# Patient Record
Sex: MALE | Race: WHITE | NOT HISPANIC OR LATINO | Employment: UNEMPLOYED | URBAN - METROPOLITAN AREA
[De-identification: names, ages, dates, MRNs, and addresses within clinical notes are randomized per-mention and may not be internally consistent; named-entity substitution may affect disease eponyms.]

---

## 2023-09-06 ENCOUNTER — HOSPITAL ENCOUNTER (OUTPATIENT)
Facility: CLINIC | Age: 43
Setting detail: OBSERVATION
Discharge: NURSING, REHAB OR LONG-TERM CARE | End: 2023-09-07
Attending: FAMILY MEDICINE | Admitting: FAMILY MEDICINE
Payer: MEDICARE

## 2023-09-06 DIAGNOSIS — Z87.820 HISTORY OF TRAUMATIC BRAIN INJURY: Primary | ICD-10-CM

## 2023-09-06 DIAGNOSIS — F32.A DEPRESSION, UNSPECIFIED DEPRESSION TYPE: ICD-10-CM

## 2023-09-06 DIAGNOSIS — R45.851 SUICIDAL IDEATION: ICD-10-CM

## 2023-09-06 DIAGNOSIS — F15.10 METHAMPHETAMINE ABUSE (H): ICD-10-CM

## 2023-09-06 DIAGNOSIS — S06.9X9S TRAUMATIC BRAIN INJURY WITH LOSS OF CONSCIOUSNESS, SEQUELA (H): ICD-10-CM

## 2023-09-06 PROBLEM — F15.90 STIMULANT USE DISORDER: Status: ACTIVE | Noted: 2023-09-06

## 2023-09-06 PROCEDURE — 99285 EMERGENCY DEPT VISIT HI MDM: CPT | Mod: 25 | Performed by: FAMILY MEDICINE

## 2023-09-06 PROCEDURE — 90791 PSYCH DIAGNOSTIC EVALUATION: CPT

## 2023-09-06 PROCEDURE — G0378 HOSPITAL OBSERVATION PER HR: HCPCS

## 2023-09-06 PROCEDURE — 99223 1ST HOSP IP/OBS HIGH 75: CPT | Performed by: FAMILY MEDICINE

## 2023-09-06 RX ORDER — DIVALPROEX SODIUM 250 MG/1
250 TABLET, DELAYED RELEASE ORAL 2 TIMES DAILY WITH MEALS
Qty: 30 TABLET | Refills: 0 | Status: SHIPPED | OUTPATIENT
Start: 2023-09-06

## 2023-09-06 RX ORDER — QUETIAPINE FUMARATE 50 MG/1
50 TABLET, FILM COATED ORAL AT BEDTIME
Qty: 30 TABLET | Refills: 0 | Status: SHIPPED | OUTPATIENT
Start: 2023-09-06

## 2023-09-06 RX ORDER — GABAPENTIN 300 MG/1
300 CAPSULE ORAL 3 TIMES DAILY
Qty: 30 CAPSULE | Refills: 0 | Status: SHIPPED | OUTPATIENT
Start: 2023-09-06

## 2023-09-06 RX ORDER — QUETIAPINE FUMARATE 25 MG/1
50 TABLET, FILM COATED ORAL AT BEDTIME
Status: DISCONTINUED | OUTPATIENT
Start: 2023-09-06 | End: 2023-09-07 | Stop reason: HOSPADM

## 2023-09-06 RX ORDER — GABAPENTIN 300 MG/1
300 CAPSULE ORAL 3 TIMES DAILY
Status: DISCONTINUED | OUTPATIENT
Start: 2023-09-07 | End: 2023-09-07 | Stop reason: HOSPADM

## 2023-09-06 RX ORDER — DIVALPROEX SODIUM 250 MG/1
250 TABLET, DELAYED RELEASE ORAL 2 TIMES DAILY WITH MEALS
Status: DISCONTINUED | OUTPATIENT
Start: 2023-09-07 | End: 2023-09-07 | Stop reason: HOSPADM

## 2023-09-06 RX ORDER — VENLAFAXINE 50 MG/1
50 TABLET ORAL DAILY
Status: DISCONTINUED | OUTPATIENT
Start: 2023-09-07 | End: 2023-09-07 | Stop reason: HOSPADM

## 2023-09-06 RX ORDER — DIVALPROEX SODIUM 250 MG/1
750 TABLET, DELAYED RELEASE ORAL AT BEDTIME
Qty: 90 TABLET | Refills: 0 | Status: SHIPPED | OUTPATIENT
Start: 2023-09-06

## 2023-09-06 RX ORDER — VENLAFAXINE 50 MG/1
50 TABLET ORAL DAILY
Qty: 30 TABLET | Refills: 0 | Status: SHIPPED | OUTPATIENT
Start: 2023-09-06

## 2023-09-06 ASSESSMENT — ACTIVITIES OF DAILY LIVING (ADL)
ADLS_ACUITY_SCORE: 35

## 2023-09-06 NOTE — ED TRIAGE NOTES
Triage Assessment       Row Name 09/06/23 1611       Triage Assessment (Adult)    Airway WDL WDL       Respiratory WDL    Respiratory WDL WDL       Skin Circulation/Temperature WDL    Skin Circulation/Temperature WDL WDL       Cardiac WDL    Cardiac WDL WDL       Peripheral/Neurovascular WDL    Peripheral Neurovascular WDL WDL       Cognitive/Neuro/Behavioral WDL    Cognitive/Neuro/Behavioral WDL WDL                     no
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
## 2023-09-06 NOTE — PLAN OF CARE
Sacha Fraser  September 6, 2023  Plan of Care Hand-off Note     Patient Care Path: discharge    Plan for Care:   Patient presented in the ED for depression and suicidal ideation. It was unclear is he had a plan. His suicidal intent was motivated by housing and needing a place to stay, however, pt has not been taking his medication to help stabilize his sympmtoms. Patient doesn't appear in acute risk for suicide at this time and the conversation about suicide was conencted to not wanting to be discharged to the streets. He does have peer recovery spoecialist who have the capability to check in with him. Assessors recommend pt to crisis residence, medication management, and HEAVENLY assessment for his meth use.    Patient was referred to [facility]. Patient has been accepted to [facility] but they are wanting him to stabilize overnight in the ED prior to presenting there. Patient can discharge to [facility] in the morning.     Identified Goals and Safety Issues:  Passive suicidal ideation      Legal Status: Legal Status at Admission: Voluntary/Patient has signed consent for treatment    Psychiatry Consult: No psychiatry consult needed at this time.        Updated MD and RN regarding plan of care.      Enrico Vieira

## 2023-09-06 NOTE — DISCHARGE INSTRUCTIONS
Patient to discharge to [facility]- [address] [place] [address] Crisis Residence on 9/7/2023.    Medications to be discharged with patient. (30 day supply sent to [facility])      Aftercare Plan    Follow up with established providers and supports as scheduled. Continue taking medications as prescribed. Abstain from drugs and alcohol. Utilize your [facility] mental health crisis team as needed. They are available 24/7. Contact information is listed below.     You may self refer for most Crisis Residence services. This is a short-term service, typically 3-10 days, for stabilization when experiencing a mental health crisis. They provide housing and treatment services that integrate mental health, medical, and substance use care.    [employer] [facility] [employer]  Main phone: [phone]   Direct: [phone]   [address] [place] [address]     [facility] Crisis Stabilization Program   [phone]   [address] [facility] [address]     Apolonia Singh Located within [facility].  Main phone: [phone]   Direct: [phone]   [address] [place] [address]     [facility]  [phone]   [address] [place] [address]     [facility] Crisis **requires referral from a medical facility  [phone]   [address] [place] [address]     Wherever you attend for a crisis residence, if possible bring any medications you may have in their prescribed bottles.      Aftercare Plan     [facility] Front Door: [phone]  For help getting access to [facility] resources and supports     [facility]: [phone]  [address] [place] [address]  Will help get connected with mental health and physical health services, set up and access insurance, access safe and stable housing, and offers case management services to help you stay on track to reach personal goals.     Yenni  [facility] ([facility]) [address] [address] Phone: [phone]    Hours: Monday and Thursday 10-11:30 am. Drop in center that is staffed with nursing students, nursing staff, student PA s and faculty.  Not a clinic, but focuses on basic healthcare and personal hygiene needs.  Closed when schools are close for the weather or on school breaks, so call ahead      [facility] [address] [address] Phone: [phone]    Hours: Monday through Friday 8 am-5 pm. Community clinic that offers free or low-cost (and sliding fee) health care.  Can help with getting set up with insurance. Offers a wide range of services including family planning (testing, morning after, birth control), pediatrics, counseling, dental for minors, etc.  Can call or walk in.      [facility] [address] [facility], 5th Floor, [place] [address] Phone: [phone]    Hours: Monday-Friday 8 am-5 pm. Call for appointment. [facility] for all ages       [facility] [address], [place] [address] Phone: [phone] Website: [email or web address]    Hours: By appointment only. (Some same-day appointments available.) Monday-Thursday 9 am-7 pm; Friday 9 am-4pm; Saturday 10 am-2 pm. Sliding fee scale.  Sexual health, women's health, family planning.        [facility] [address] [address] Phone: [phone]    Hours: Monday, Wednesday, Thursday and Friday 8 am-5 pm; Tuesday 8 am-8 pm. Sliding fee scale.  medical, dental, disease prevention, diabetic support and counseling      [facility] [address] [address] Phone: [phone], option 1    Hours: Walk-ins Monday, Wednesday, Thursday and Friday 8:30- 11:30 am and 1-3 pm; Tuesday 9:30-11:30 am and 1:45-3 pm. Sliding fee scale. medical, dental, pediatrics, diabetic support and  counseling      [facility] [address] [facility] [address] [facility] [address] [facility] [address] [facility] [address] [place] [address] Main Phone: [phone]    Hours: Monday-Friday 8 am-5 pm. Sliding fee scale.  Cancer screens, CD and mental health services.  Basic medical for adults and peds.  Family planning, testing hypertension screenings.      [facility] [address] [facility] [address] Phone: For Appointments call [phone]    Hours: Monday-Friday 8 am-5 pm. Provides: provides a large range of medical services      [facility] Phone: [phone]   [facility] [address] [place] [address]    Hours: Monday-Friday 8:30 am-5:30 pm.*       [facility] [address] [place] [address]    Hours: Monday-Friday 8:30 am-4:30 pm.      Clinic hours are subject to change as a result of the COVID-19 pandemic.    Sliding fee scale. provides a wide range of medical services, including testing.  Sexual health, family planning, birth control      [facility] [address] [facility] [address] ([facility]) Phone: [phone] Website: [email or web address]    Hours: Monday and Thursday evenings 5:30-9 pm (except holidays) Provides: Testing, physicals, lab testing, help with setting up insurance through [place] and [facility].       [facility] [address] [address] Phone: [phone]    Hours: Monday, Tuesday, Thursday, and Friday 7:30 am-5 pm; Wednesday 7:30 am-7 pm. Sliding fee scale.  Sexual health, family planning, birth control.  Mental health     Vision clinic open at [address] Monday-Thursday 8 am-4 pm and Friday 8 am-12 pm.      [facility] Provides: preventative health. Intake procedure: Call  [phone] Monday through Friday 8 am-4 pm to make an appointment.      [facility] [address], [place] [address] Phone: [phone]    Hours: Monday-Friday 8 am-5 pm.  Sleep clinic.  Family medicine.  Help with setting up insurance through [place] and [employer].        If I am feeling unsafe or I am in a crisis, I will:   Contact my established care providers   Call the [place] Suicide Prevention Lifeline: [phone]   Go to the nearest emergency room   Call [phone]     Warning signs that I or other people might notice when a crisis is developing for me: changes to sleep, appetite or mood, increased anger, agitation or irritability, feeling depressed or hopeless, spending more time alone or talking less, increased crying, decreased productivity, seeing or hearing things that aren't there, thoughts of not wanting to live anymore or of actually killing myself, thoughts of hurting others    Things I am able to do on my own to cope or help me feel better: watching a favorite tv show or movie, listening to music I enjoy, going outside and breathing fresh air, going for a walk or exercising, taking a shower or bath, a cold or hot beverage, a healthy snack, drawing/coloring/painting, journaling, singing or dancing, deep breathing     I can try practicing square breathing when I begin to feel anxious - inhale through the nose for the count of 4 and the first line on the square. Exhale through the mouth for the count of 4 for the second line of the square. Repeat to complete the square. Repeat the square as many times as needed.    I can also use my five senses to practice mindfulness and grounding. What are five things I can see, four things I can hear, three things I can feel, two things I can smell, and one thing I can taste.     Things that I am able to do with others to cope or help me feel better: sometimes just talking or spending time with someone else, sharing a meal or having coffee,  "watching a movie or playing a game, going for a walk or exercising    I can also use community resources including mental health hotlines, Critical access hospital crisis teams, or apps.     Things I can use or do for distraction: movies/tv, music, reading, games, drawing/coloring/painting or other art, essential oils, exercise, cleaning/organizing, puzzles, crossword puzzles, word search, Sudoku       I can also download a meditation or relaxation deisy, like Calm, Headspace, or Insight Timer (all three offer a free version)    Changes I can make to support my mental health and wellness: Attend scheduled mental health therapy and psychiatric appointments. Take my medications as prescribed. Maintain a daily schedule/routine. Abstain from all mood altering substances, including drugs, alcohol, or medications not currently prescribed to me. Implement a self-care routine.      People in my life that I can ask for help: friends or family, trusted teachers/staff/colleagues, trusted members of my community or place of [employer], mental health crisis lines, or 911    Your Critical access hospital has a mental health crisis team you can call 24/7: [facility], [phone]    Other things that are important when I m in crisis: to remember that the feelings I am having right now are temporary, and it won't feel like this forever, and that it is okay and important to ask for help    Crisis Lines  Crisis Text Line  Text 361083  You will be connected with a trained live crisis counselor to provide support.    Por espanol, texto  JUSTIN a 557716 o texto a 442-AYUDAME en [facility] Hope Line  1.800.SUICIDE [4108447]      Community Resources  Fast Tracker  Linking people to mental health and substance use disorder resources  fasttrackermn.org     [place] Mental Health Warm Line  Peer to peer support  Monday thru Saturday, 12 pm to 10 pm  935.605.7154 or 5.665.097.5854  Text \"Support\" to 22965    National [place] on Mental Illness (LAURIE)  " 864.412.8603 or 1.888.LAURIE.HELPS      Mental Health Apps  My3  https://[employer] Swan Energypp.org/    VirtualHopeBox  https://Talisma/apps/virtual-hope-box/      Additional Information  Today you were seen by a licensed mental health professional through Triage and Transition services, [facility] (P)  for a crisis assessment in the Emergency Department at [facility].  It is recommended that you follow up with your established providers (psychiatrist, mental health therapist, and/or primary care doctor - as relevant) as soon as possible. Coordinators from [facility] will be calling you in the next 24-48 hours to ensure that you have the resources you need.  You can also contact [facility] coordinators directly at [phone]. You may have been scheduled for or offered an appointment with a mental health provider. [facility] maintains an extensive network of licensed behavioral health providers to connect patients with the services they need.  We do not charge providers a fee to participate in our referral network.  We match patients with providers based on a patient's specific needs, insurance coverage, and location.  Our first effort will be to refer you to a provider within your care system, and will utilize providers outside your care system as needed.

## 2023-09-07 VITALS
HEART RATE: 69 BPM | RESPIRATION RATE: 16 BRPM | DIASTOLIC BLOOD PRESSURE: 71 MMHG | SYSTOLIC BLOOD PRESSURE: 107 MMHG | OXYGEN SATURATION: 98 % | TEMPERATURE: 97.5 F

## 2023-09-07 LAB
AMPHETAMINES UR QL SCN: ABNORMAL
BARBITURATES UR QL SCN: ABNORMAL
BENZODIAZ UR QL SCN: ABNORMAL
BZE UR QL SCN: ABNORMAL
CANNABINOIDS UR QL SCN: ABNORMAL
OPIATES UR QL SCN: ABNORMAL

## 2023-09-07 PROCEDURE — G0378 HOSPITAL OBSERVATION PER HR: HCPCS

## 2023-09-07 PROCEDURE — 250N000013 HC RX MED GY IP 250 OP 250 PS 637: Performed by: FAMILY MEDICINE

## 2023-09-07 PROCEDURE — 99238 HOSP IP/OBS DSCHRG MGMT 30/<: CPT | Performed by: EMERGENCY MEDICINE

## 2023-09-07 PROCEDURE — 80307 DRUG TEST PRSMV CHEM ANLYZR: CPT | Performed by: FAMILY MEDICINE

## 2023-09-07 RX ADMIN — GABAPENTIN 300 MG: 300 CAPSULE ORAL at 09:02

## 2023-09-07 RX ADMIN — GABAPENTIN 300 MG: 300 CAPSULE ORAL at 13:12

## 2023-09-07 RX ADMIN — VENLAFAXINE 50 MG: 50 TABLET ORAL at 09:02

## 2023-09-07 ASSESSMENT — ACTIVITIES OF DAILY LIVING (ADL)
ADLS_ACUITY_SCORE: 35

## 2023-09-07 NOTE — ED NOTES
Patient slept throughout the shift with no s/s of acute distress. The patient was noted to be breathing with ease during safety rounds. Patient experienced no safety events or escalations during the shift. Will continue to monitor.

## 2023-09-07 NOTE — ED NOTES
Pt to be transported to [facility]. Report called. Pt updated, pt agreeable to transfer. No questions or concerns at this time. VSS. AOx4

## 2023-09-07 NOTE — PROGRESS NOTES
Writer called [facility] to make sure they have all the paperwork they need to review patient for a crisis bed. They have the paperwork and plan to review patient this morning and will call writer back with decision on acceptance.    10:00 AM- patient is accepted to [facility]- [address] [place] [address]. He can arrive anytime before 5pm today. He will need at least 10 days of medications sent with him. Writer informed nurse of this and requested they get the meds ordered and transport set up. Writer is around until 4pm and available for any questions or concerns.     1:20 PM: Writer spoke with Dr. Nation, medications were ordered last night (9/6) and ready at the outpatient pharmacy, she will place discharge orders. Writer connected with patient's nurse and informed her about the meds and that patient needs to be on his way to [facility], address is in [place].     Kika Espinoza MA  Extended Care-Coordinator  [phone]

## 2023-09-07 NOTE — ED NOTES
Triage & Transition Services, Extended Care    Client Name: Sacha Fraser    Date: September 7, 2023  Service Type:  Group Therapy  S     Response:  Patient did not participate in group.      Andrea Livingston LGSW

## 2023-09-07 NOTE — ED PROVIDER NOTES
Report given by DEC . Patient brought in and accompanied by a  for SI. He has a history of mental health concerns. He has not been taking his prescriptions recently. He reports no plan for suicide. He makes suicidal statements around housing such as 'if I can't stay here I will kill myself'.     Extended care involved and arrangements made for patient todischarge to [facility].  Dr. Lyles filled 30 day  supply of his meds yesterday.      Roseanne Nation MD  09/07/23 [id number]       Roseanne Nation MD  09/07/23 2688

## 2023-09-07 NOTE — PROGRESS NOTES
Pt came from ED to [facility] . Pleasant cooperative. Ate snack.  Will continue to monitor pt.

## 2023-09-07 NOTE — ED NOTES
Writer spoke with Puneet from [facility] ([phone]) regarding pt discharging there. Puneet stated pt can come at anytime this AM and pt will need 30 days of meds sent in hand with them.

## 2023-09-07 NOTE — ED NOTES
Patient has been accepted to [facility] and go in the AM.  Please call Lilian for time. [phone]. [address], [place] [address]